# Patient Record
(demographics unavailable — no encounter records)

---

## 2025-04-21 NOTE — HISTORY OF PRESENT ILLNESS
[FreeTextEntry1] : 58 yo M with PMH HTN who presents for CRC screening.    Patient denies nausea, vomiting, diarrhea, constipation, abdominal pain.  Also denies coffee-ground emesis, hematemesis, hematochezia, melena.  Denies prior travel.  Denies weight loss.  Denies family history of malignancy.  HTN was on lisinopril and PCP had it stopped due to cough.

## 2025-04-21 NOTE — ASSESSMENT
[FreeTextEntry1] :  Discussed risks and benefits of procedure including infection, bleeding, perforation.  Patient is in agreement with plan for procedure.  Preparation sent to pharmacy.  Discussed management of anticoagulation and SGLT2/GLP-1 medications.

## 2025-05-19 NOTE — PHYSICAL EXAM

## 2025-05-19 NOTE — ASSESSMENT
[FreeTextEntry1] :  Reviewed labs in system and prior notes. Discussed risks and benefits of procedure including infection, bleeding, perforation.  Patient is in agreement with plan for procedure.